# Patient Record
Sex: MALE | Race: OTHER | HISPANIC OR LATINO | URBAN - METROPOLITAN AREA
[De-identification: names, ages, dates, MRNs, and addresses within clinical notes are randomized per-mention and may not be internally consistent; named-entity substitution may affect disease eponyms.]

---

## 2020-12-27 ENCOUNTER — EMERGENCY (EMERGENCY)
Facility: HOSPITAL | Age: 25
LOS: 1 days | Discharge: HOME | End: 2020-12-27
Attending: STUDENT IN AN ORGANIZED HEALTH CARE EDUCATION/TRAINING PROGRAM
Payer: COMMERCIAL

## 2020-12-27 VITALS
SYSTOLIC BLOOD PRESSURE: 138 MMHG | HEART RATE: 80 BPM | DIASTOLIC BLOOD PRESSURE: 77 MMHG | OXYGEN SATURATION: 99 % | RESPIRATION RATE: 16 BRPM | WEIGHT: 147.93 LBS | HEIGHT: 66 IN | TEMPERATURE: 98 F

## 2020-12-27 LAB
APPEARANCE UR: CLEAR — SIGNIFICANT CHANGE UP
BILIRUB UR-MCNC: NEGATIVE — SIGNIFICANT CHANGE UP
COLOR SPEC: YELLOW — SIGNIFICANT CHANGE UP
DIFF PNL FLD: NEGATIVE — SIGNIFICANT CHANGE UP
GLUCOSE UR QL: NEGATIVE MG/DL — SIGNIFICANT CHANGE UP
HIV 1 & 2 AB SERPL IA.RAPID: SIGNIFICANT CHANGE UP
KETONES UR-MCNC: NEGATIVE — SIGNIFICANT CHANGE UP
LEUKOCYTE ESTERASE UR-ACNC: NEGATIVE — SIGNIFICANT CHANGE UP
NITRITE UR-MCNC: NEGATIVE — SIGNIFICANT CHANGE UP
PH UR: 6 — SIGNIFICANT CHANGE UP (ref 5–8)
PROT UR-MCNC: NEGATIVE MG/DL — SIGNIFICANT CHANGE UP
SP GR SPEC: 1.01 — SIGNIFICANT CHANGE UP (ref 1.01–1.02)
UROBILINOGEN FLD QL: NEGATIVE MG/DL — SIGNIFICANT CHANGE UP

## 2020-12-27 PROCEDURE — 99284 EMERGENCY DEPT VISIT MOD MDM: CPT

## 2020-12-27 NOTE — ED STATDOCS - OBJECTIVE STATEMENT
24 y/o male with no PMHx presents to ED c/o urinary symptoms. Patient reports 2 weeks of dysuria, suprapubic pain, and urinary hesitancy. Had urine test 2 weeks ago, unknown results and no medications were given. Patient endorses burning to the tip of with urination. Patient is sexually active with 1 partner, does not use barrier contraceptives, low concern for STI. patient does want to be HIV tested.     Pt denies fevers/chills, ha, loc, focal neuro deficits, cp/sob/palp, cough, abd pain/n/v/d, urinary symptoms, recent travel and sick contacts, penile discharge

## 2020-12-27 NOTE — ED STATDOCS - NSFOLLOWUPCLINICS_GEN_ALL_ED_FT
[id number] [place] [address]  Phone: [phone]  Fax:   Follow Up Time: 7-10 Days

## 2020-12-27 NOTE — ED STATDOCS - CLINICAL SUMMARY MEDICAL DECISION MAKING FREE TEXT BOX
24 y/o male with no PMHx presents to ED c/o urinary symptoms. Patient offered STI prophylaxis, but states he is low risk and wants to wait for results prior to treatment. UA, urine culture, HIV test.

## 2020-12-27 NOTE — ED STATDOCS - GENITOURINARY, MLM
normal... Exam: Uncircumcised male,  normal foreskin glands no discharge, skin changes, +premasseteric reflexes, non tender scrotum, no masses palpated, no hernias appreciated.   (Chaperone Wilman JOHNSON) bladder tenderness, no bilateral CVA tenderness.

## 2020-12-27 NOTE — ED STATDOCS - PATIENT PORTAL LINK FT
You can access the FollowMyHealth Patient Portal offered by [facility] by registering at the following website: [email or web address]. By joining [employer]’s FollowMyHealth portal, you will also be able to view your health information using other applications (apps) compatible with our system.

## 2020-12-28 LAB
C TRACH RRNA SPEC QL NAA+PROBE: SIGNIFICANT CHANGE UP
CULTURE RESULTS: NO GROWTH — SIGNIFICANT CHANGE UP
N GONORRHOEA RRNA SPEC QL NAA+PROBE: SIGNIFICANT CHANGE UP
SPECIMEN SOURCE: SIGNIFICANT CHANGE UP

## 2020-12-28 PROCEDURE — 81003 URINALYSIS AUTO W/O SCOPE: CPT

## 2020-12-28 PROCEDURE — 86703 HIV-1/HIV-2 1 RESULT ANTBDY: CPT

## 2020-12-28 PROCEDURE — 87086 URINE CULTURE/COLONY COUNT: CPT

## 2020-12-28 PROCEDURE — T1013: CPT

## 2020-12-28 PROCEDURE — 87591 N.GONORRHOEAE DNA AMP PROB: CPT

## 2020-12-28 PROCEDURE — 36415 COLL VENOUS BLD VENIPUNCTURE: CPT

## 2020-12-28 PROCEDURE — 99283 EMERGENCY DEPT VISIT LOW MDM: CPT

## 2020-12-28 PROCEDURE — 87491 CHLMYD TRACH DNA AMP PROBE: CPT

## 2021-04-14 NOTE — ED STATDOCS - CHPI ED SYMPTOM POS
13-Apr-2021 16:07 20-Mar-2021 14-Apr-2021 19:10 20-Mar-2021 13-Apr-2021 19:08 13-Apr-2021 19:07 20-Mar-2021 18:14 14-Apr-2021 19:20 painful urination/PAIN

## 2023-01-18 NOTE — ED STATDOCS - PRO INTERPRETER NEED 2
Goal Outcome Evaluation:  Plan of Care Reviewed With: patient        Progress: improving  Outcome Evaluation: patient ambulating with assistance to bathroom, voiding without difficulty, PO medication given for pain, plans to d/c home 1/18/23, educated on b/p monitoring   Serbian

## 2023-01-19 ENCOUNTER — OFFICE (OUTPATIENT)
Dept: URBAN - METROPOLITAN AREA CLINIC 63 | Facility: CLINIC | Age: 28
Setting detail: OPHTHALMOLOGY
End: 2023-01-19
Payer: COMMERCIAL

## 2023-01-19 DIAGNOSIS — H10.45: ICD-10-CM

## 2023-01-19 PROCEDURE — 92012 INTRM OPH EXAM EST PATIENT: CPT | Performed by: STUDENT IN AN ORGANIZED HEALTH CARE EDUCATION/TRAINING PROGRAM

## 2023-01-19 ASSESSMENT — CONFRONTATIONAL VISUAL FIELD TEST (CVF)
OD_FINDINGS: FULL
OS_FINDINGS: FULL

## 2023-01-19 ASSESSMENT — PACHYMETRY
OD_CT_CORRECTION: -6
OD_CT_UM: 627
OS_CT_UM: 616
OS_CT_CORRECTION: -5

## 2023-01-19 ASSESSMENT — KERATOMETRY
OS_K1POWER_DIOPTERS: 42.25
OD_K2POWER_DIOPTERS: 43.00
OD_K1POWER_DIOPTERS: 42.50
METHOD_AUTO_MANUAL: AUTO
OS_K2POWER_DIOPTERS: 42.75
OS_AXISANGLE_DEGREES: 080
OD_AXISANGLE_DEGREES: 101

## 2023-01-19 ASSESSMENT — SUPERFICIAL PUNCTATE KERATITIS (SPK)
OD_SPK: 1+
OS_SPK: 1+

## 2023-01-19 ASSESSMENT — REFRACTION_AUTOREFRACTION
OS_AXIS: 000
OS_CYLINDER: 0.00
OD_CYLINDER: -0.25
OD_SPHERE: 0.00
OS_SPHERE: -0.50
OD_AXIS: 061

## 2023-01-19 ASSESSMENT — REFRACTION_MANIFEST
OD_SPHERE: -0.25
OS_VA1: 20/20
OS_SPHERE: -0.25
OD_VA1: 20/20

## 2023-01-19 ASSESSMENT — SPHEQUIV_DERIVED
OD_SPHEQUIV: -0.125
OS_SPHEQUIV: -0.5

## 2023-01-19 ASSESSMENT — TONOMETRY
OD_IOP_MMHG: 21
OS_IOP_MMHG: 21

## 2023-01-19 ASSESSMENT — LID EXAM ASSESSMENTS
OS_MEIBOMITIS: LLL 1+
OD_MEIBOMITIS: RLL 1+

## 2023-01-19 ASSESSMENT — VISUAL ACUITY
OS_BCVA: 20/20
OD_BCVA: 20/20

## 2023-01-19 ASSESSMENT — AXIALLENGTH_DERIVED
OD_AL: 23.9206
OS_AL: 24.1677

## 2023-02-09 ENCOUNTER — OFFICE (OUTPATIENT)
Dept: URBAN - METROPOLITAN AREA CLINIC 94 | Facility: CLINIC | Age: 28
Setting detail: OPHTHALMOLOGY
End: 2023-02-09
Payer: COMMERCIAL

## 2023-02-09 ENCOUNTER — RX ONLY (RX ONLY)
Age: 28
End: 2023-02-09

## 2023-02-09 DIAGNOSIS — H40.013: ICD-10-CM

## 2023-02-09 DIAGNOSIS — H01.001: ICD-10-CM

## 2023-02-09 DIAGNOSIS — H01.004: ICD-10-CM

## 2023-02-09 PROBLEM — H16.223 DRY EYE SYNDROME K SICCA; BOTH EYES: Status: ACTIVE | Noted: 2023-01-19

## 2023-02-09 PROBLEM — H10.43 ALLERGIC CONJUNCTIVITIS: Status: ACTIVE | Noted: 2023-02-09

## 2023-02-09 PROCEDURE — 92012 INTRM OPH EXAM EST PATIENT: CPT | Performed by: OPHTHALMOLOGY

## 2023-02-09 PROCEDURE — 92133 CPTRZD OPH DX IMG PST SGM ON: CPT | Performed by: OPHTHALMOLOGY

## 2023-02-09 ASSESSMENT — LID EXAM ASSESSMENTS
OS_BLEPHARITIS: LUL 1+
OS_MEIBOMITIS: LLL 1+
OD_BLEPHARITIS: RUL 1+
OD_MEIBOMITIS: RLL 1+

## 2023-02-09 ASSESSMENT — REFRACTION_MANIFEST
OD_SPHERE: -0.25
OD_VA1: 20/20
OS_SPHERE: -0.25
OS_VA1: 20/20

## 2023-02-09 ASSESSMENT — AXIALLENGTH_DERIVED
OS_AL: 24.0154
OD_AL: 24.0183

## 2023-02-09 ASSESSMENT — PACHYMETRY
OS_CT_CORRECTION: -5
OD_CT_UM: 627
OD_CT_CORRECTION: -6
OS_CT_UM: 616

## 2023-02-09 ASSESSMENT — CONFRONTATIONAL VISUAL FIELD TEST (CVF)
OD_FINDINGS: FULL
OS_FINDINGS: FULL

## 2023-02-09 ASSESSMENT — REFRACTION_AUTOREFRACTION
OD_CYLINDER: -0.50
OS_AXIS: 160
OD_SPHERE: 0.00
OS_CYLINDER: -0.25
OS_SPHERE: 0.00
OD_AXIS: 069

## 2023-02-09 ASSESSMENT — SPHEQUIV_DERIVED
OS_SPHEQUIV: -0.125
OD_SPHEQUIV: -0.25

## 2023-02-09 ASSESSMENT — SUPERFICIAL PUNCTATE KERATITIS (SPK)
OS_SPK: 1+
OD_SPK: 1+

## 2023-02-09 ASSESSMENT — VISUAL ACUITY
OS_BCVA: 20/20
OD_BCVA: 20/20

## 2023-02-09 ASSESSMENT — KERATOMETRY
OS_AXISANGLE_DEGREES: 077
METHOD_AUTO_MANUAL: AUTO
OD_K2POWER_DIOPTERS: 42.75
OS_K1POWER_DIOPTERS: 42.00
OD_K1POWER_DIOPTERS: 42.50
OS_K2POWER_DIOPTERS: 43.00
OD_AXISANGLE_DEGREES: 111